# Patient Record
(demographics unavailable — no encounter records)

---

## 2025-04-07 NOTE — HISTORY OF PRESENT ILLNESS
[FreeTextEntry1] : 24 yo P0 presents for initial preventative exam. She is currently taking OCPs and is pleased and needs rx. She takes Junel fe 1.5/30 and desires brand name. When she tried the generic, she was symptomatic. She takes it for both dysmenorrhea and pregnancy prevention. She never had a pap smear. No gyn complaints.   BRCA questionnaire- no increased risk to warrant testing.

## 2025-04-07 NOTE — END OF VISIT
[TextEntry] : IKrysta, am scribing for and the presence of Dr. Marissa Churchill the following sections HISTORY OF PRESENT ILLNESS, PAST MEDICAL/FAMILY/SOCIAL HISTORY; REVIEW OF SYSTEMS; PHYSICAL EXAM; DISPOSITION.

## 2025-04-07 NOTE — HISTORY OF PRESENT ILLNESS
[FreeTextEntry1] : 22 yo P0 presents for initial preventative exam. She is currently taking OCPs and is pleased and needs rx. She takes Junel fe 1.5/30 and desires brand name. When she tried the generic, she was symptomatic. She takes it for both dysmenorrhea and pregnancy prevention. She never had a pap smear. No gyn complaints.   BRCA questionnaire- no increased risk to warrant testing.

## 2025-04-07 NOTE — END OF VISIT
[TextEntry] : IKyrsta, am scribing for and the presence of Dr. Marissa Churchill the following sections HISTORY OF PRESENT ILLNESS, PAST MEDICAL/FAMILY/SOCIAL HISTORY; REVIEW OF SYSTEMS; PHYSICAL EXAM; DISPOSITION.